# Patient Record
(demographics unavailable — no encounter records)

---

## 2025-04-25 NOTE — HEALTH RISK ASSESSMENT
[Good] : ~his/her~  mood as  good [1 or 2 (0 pts)] : 1 or 2 (0 points) [Never (0 pts)] : Never (0 points) [No falls in past year] : Patient reported no falls in the past year [Little interest or pleasure doing things] : 1) Little interest or pleasure doing things [Feeling down, depressed, or hopeless] : 2) Feeling down, depressed, or hopeless [0] : 2) Feeling down, depressed, or hopeless: Not at all (0) [PHQ-2 Negative - No further assessment needed] : PHQ-2 Negative - No further assessment needed [Patient reported PAP Smear was abnormal] : Patient reported PAP Smear was abnormal [HIV test declined] : HIV test declined [Hepatitis C test declined] : Hepatitis C test declined [Alone] : lives alone [Employed] : employed [Single] : single [Feels Safe at Home] : Feels safe at home [Fully functional (bathing, dressing, toileting, transferring, walking, feeding)] : Fully functional (bathing, dressing, toileting, transferring, walking, feeding) [Fully functional (using the telephone, shopping, preparing meals, housekeeping, doing laundry, using] : Fully functional and needs no help or supervision to perform IADLs (using the telephone, shopping, preparing meals, housekeeping, doing laundry, using transportation, managing medications and managing finances) [Reports changes in vision] : Reports changes in vision [Reports normal functional visual acuity (ie: able to read med bottle)] : Reports normal functional visual acuity [Smoke Detector] : smoke detector [Carbon Monoxide Detector] : carbon monoxide detector [Seat Belt] :  uses seat belt [Current] : Current [No] : In the past 12 months have you used drugs other than those required for medical reasons? No [Audit-CScore] : 0 [de-identified] : marijuana - smoking  [de-identified] : could be doing more  [de-identified] : could improve water intake; could increase protein; doing ok otherwise  [[id number]] : 0 [Change in mental status noted] : No change in mental status noted [Language] : denies difficulty with language [Behavior] : denies difficulty with behavior [Learning/Retaining New Information] : denies difficulty learning/retaining new information [Handling Complex Tasks] : denies difficulty handling complex tasks [Reasoning] : denies difficulty with reasoning [Spatial Ability and Orientation] : denies difficulty with spatial ability and orientation [Sexually Active] : not sexually active [Reports changes in hearing] : Reports no changes in hearing [Reports changes in dental health] : Reports no changes in dental health [PapSmearDate] : 02/2025 [de-identified] : vision getting worse; pending appt in [place]  [PapSmearComments] : HPV+, LSIL; colposcopy in March [de-identified] : marijuana

## 2025-04-25 NOTE — HISTORY OF PRESENT ILLNESS
[FreeTextEntry1] : Patient here for establish care and complete physical [de-identified] : Patient is a 33 year old F here to establish care and complete annual physical  Bilateral shoulder soreness reports that posture is poor there is also quite a bit of tightness in the calves and thigh region  walking 4 mi daily but no specific stretches or strength training exercises

## 2025-04-25 NOTE — PLAN
[FreeTextEntry1] : Wellness CBC/CMP lipid panel TDAP administered pap smear utd - patient LSIL and HPV+, following with gyn  Anxiety with increased episodes of claustrophobia and social anxiety on subway paul 7 score of 9 plan start zoloft 25mg daily, counseled on risks and benefits referral to CBT   Bilateral Shoulder Knots recommending PT for TENS and shoulder flexibility exercises and activity modification   Left Knee Meniscus Injury with positive Jessica's and thessay on the left side. also with some effusion plan referral to PT to work on strengthening   RTC in 6-8 weeks for mood symptoms

## 2025-04-25 NOTE — PHYSICAL EXAM
[Normal Sclera/Conjunctiva] : normal sclera/conjunctiva [Normal Outer Ear/Nose] : the outer ears and nose were normal in appearance [Normal] : normal rate, regular rhythm, normal S1 and S2 and no murmur heard [No Edema] : there was no peripheral edema [Soft] : abdomen soft [Non-distended] : non-distended [de-identified] : DEEPTHI 7 score of 9

## 2025-04-25 NOTE — PAST MEDICAL HISTORY
[Menstruating] : menstruating [Normal Amount/Duration] : it was of a normal amount and duration [Regular Cycle Intervals] : have been regular [de-identified] : off birthcontrol at this time

## 2025-06-13 NOTE — HEALTH RISK ASSESSMENT
[1 or 2 (0 pts)] : 1 or 2 (0 points) [Never (0 pts)] : Never (0 points) [No] : In the past 12 months have you used drugs other than those required for medical reasons? No [No falls in past year] : Patient reported no falls in the past year [Little interest or pleasure doing things] : 1) Little interest or pleasure doing things [Feeling down, depressed, or hopeless] : 2) Feeling down, depressed, or hopeless [0] : 2) Feeling down, depressed, or hopeless: Not at all (0) [PHQ-2 Negative - No further assessment needed] : PHQ-2 Negative - No further assessment needed [Current] : Current [Audit-CScore] : 0 [de-identified] : marijuana - smoking  [de-identified] : could be doing more  [de-identified] : could improve water intake; could increase protein; doing ok otherwise  [[id number]] : 0 [de-identified] : marijuana

## 2025-06-13 NOTE — HISTORY OF PRESENT ILLNESS
[FreeTextEntry1] : Patient is a 34 year old female here today for follow up. [de-identified] : Patient is a 34 year old with anxiety here to follow-up  Anxiety improved on the zoloft  reacting around 50% less compared to prior to zoloft but it could still be better not yet connected to therapy and feeling indifferent about that aspect

## 2025-06-13 NOTE — PLAN
[FreeTextEntry1] : Anxiety improved on zoloft 25mg but not at goal yet pt tolerating med well with min side effects - initially some increased sweating plan increase to zoloft 50mg daily  number for CBT offered rtc in 6 weeks to f/u on mood